# Patient Record
Sex: MALE | Race: WHITE | NOT HISPANIC OR LATINO | Employment: FULL TIME | URBAN - METROPOLITAN AREA
[De-identification: names, ages, dates, MRNs, and addresses within clinical notes are randomized per-mention and may not be internally consistent; named-entity substitution may affect disease eponyms.]

---

## 2020-03-25 ENCOUNTER — OFFICE VISIT (OUTPATIENT)
Dept: OTOLARYNGOLOGY | Facility: CLINIC | Age: 35
End: 2020-03-25
Payer: COMMERCIAL

## 2020-03-25 ENCOUNTER — OFFICE VISIT (OUTPATIENT)
Dept: AUDIOLOGY | Facility: CLINIC | Age: 35
End: 2020-03-25
Payer: COMMERCIAL

## 2020-03-25 VITALS — WEIGHT: 230 LBS | TEMPERATURE: 98.3 F | BODY MASS INDEX: 29.52 KG/M2 | HEIGHT: 74 IN

## 2020-03-25 DIAGNOSIS — H61.23 BILATERAL IMPACTED CERUMEN: ICD-10-CM

## 2020-03-25 DIAGNOSIS — H72.91 PERFORATION OF RIGHT TYMPANIC MEMBRANE: ICD-10-CM

## 2020-03-25 DIAGNOSIS — H69.83 ETD (EUSTACHIAN TUBE DYSFUNCTION), BILATERAL: Primary | ICD-10-CM

## 2020-03-25 DIAGNOSIS — H90.3 SENSORY HEARING LOSS, BILATERAL: Primary | ICD-10-CM

## 2020-03-25 PROCEDURE — 99204 OFFICE O/P NEW MOD 45 MIN: CPT | Performed by: NURSE PRACTITIONER

## 2020-03-25 PROCEDURE — 92567 TYMPANOMETRY: CPT | Performed by: AUDIOLOGIST

## 2020-03-25 PROCEDURE — 69210 REMOVE IMPACTED EAR WAX UNI: CPT | Performed by: NURSE PRACTITIONER

## 2020-03-25 PROCEDURE — 92557 COMPREHENSIVE HEARING TEST: CPT | Performed by: AUDIOLOGIST

## 2020-03-25 NOTE — PROGRESS NOTES
Assessment/Plan:    ETD (Eustachian tube dysfunction), bilateral  Audiogram today indicating bilateral normal hearing, low normal at 8K  Tymps type A left and B on right  Bilateral Tm with tympanosclerosis, unable to view tympanic perforation  Reviewed treatment options including nasal steroids, watchful monitoring for up to 3 months, vs surgical interventions  Briefly discussed in office pe tube if worsens  Bilateral impacted cerumen  On exam noted bilateral cerumen impaction and unable to fully view tympanic membrane  Cerumen impaction removed bilateral eac with alligator forceps and suction, pt tolerated procedure well  Upon removal, improved hearing and decreased clogged sensation of bilateral ears  Discussed routine cerumen care including avoidance of q-tips and cerumen softeners  Encourage ongoing follow up annually to monitor for cerumen and hearing  Diagnoses and all orders for this visit:    ETD (Eustachian tube dysfunction), bilateral  -     Ambulatory referral to Audiology  -     Ear cerumen removal    Bilateral impacted cerumen  -     Ear cerumen removal    Perforation of right tympanic membrane          Subjective:      Patient ID: Kaci Liriano is a 30 y o  male  Presents today as a new patient due to ear pain  Both ears blocked  Left worse than right  No otorrhea  Noticed increased cerumen on q-tips  Hearing decreased bilaterally  Feels whooshing out of right ear at times  History of perforation right ear  History of multiple ear infections as a child with ear tubes at age 5  Prior T&A as child as well  The following portions of the patient's history were reviewed and updated as appropriate: allergies, current medications, past family history, past medical history, past social history, past surgical history and problem list     Review of Systems   Constitutional: Negative  HENT: Positive for ear pain and hearing loss   Negative for congestion, ear discharge, nosebleeds, postnasal drip, rhinorrhea, sinus pressure, sinus pain, sore throat, tinnitus and voice change  Eyes: Negative  Respiratory: Negative for chest tightness and shortness of breath  Cardiovascular: Negative  Gastrointestinal: Negative  Endocrine: Negative  Musculoskeletal: Negative  Skin: Negative for color change  Neurological: Negative for dizziness, numbness and headaches  Psychiatric/Behavioral: Negative  Objective:      Temp 98 3 °F (36 8 °C) (Oral)   Ht 6' 2" (1 88 m)   Wt 104 kg (230 lb)   BMI 29 53 kg/m²            Physical Exam   Constitutional: He is oriented to person, place, and time  He appears well-developed and well-nourished  He is cooperative  HENT:   Head: Normocephalic  Right Ear: Hearing, tympanic membrane, external ear and ear canal normal  No drainage or tenderness  Tympanic membrane is not perforated and not erythematous  No decreased hearing is noted  Left Ear: Hearing, tympanic membrane, external ear and ear canal normal  No drainage or tenderness  Tympanic membrane is not perforated and not erythematous  No decreased hearing is noted  Nose: Nose normal  No sinus tenderness, nasal deformity or septal deviation  Mouth/Throat: Uvula is midline, oropharynx is clear and moist and mucous membranes are normal  Mucous membranes are not pale and not dry  No oral lesions  Normal dentition  No oropharyngeal exudate  Bilateral cerumen impaction  Bilateral tympanosclerosis     Neck: Normal range of motion and full passive range of motion without pain  Neck supple  No tracheal deviation present  Cardiovascular: Normal rate  Pulmonary/Chest: Effort normal  No accessory muscle usage  No respiratory distress  Musculoskeletal:        Right shoulder: He exhibits normal range of motion  Lymphadenopathy:     He has no cervical adenopathy  Neurological: He is alert and oriented to person, place, and time   No cranial nerve deficit or sensory deficit  Skin: Skin is warm, dry and intact  Psychiatric: He has a normal mood and affect  Ear cerumen removal  Date/Time: 3/25/2020 10:54 AM  Performed by: SYL Ashby  Authorized by: SYL Ashby     Patient location:  Clinic  Consent:     Consent obtained:  Verbal  Universal protocol:     Procedure explained and questions answered to patient or proxy's satisfaction: yes    Procedure details:     Location:  L ear, R ear and external auditory canal    Approach:  External  Post-procedure details:     Complication:  None    Hearing quality:  Normal    Patient tolerance of procedure:   Tolerated well, no immediate complications

## 2020-03-25 NOTE — ASSESSMENT & PLAN NOTE
On exam noted bilateral cerumen impaction and unable to fully view tympanic membrane  Cerumen impaction removed bilateral eac with alligator forceps and suction, pt tolerated procedure well  Upon removal, improved hearing and decreased clogged sensation of bilateral ears  Discussed routine cerumen care including avoidance of q-tips and cerumen softeners  Encourage ongoing follow up annually to monitor for cerumen and hearing

## 2020-03-25 NOTE — ASSESSMENT & PLAN NOTE
Audiogram today indicating bilateral normal hearing, low normal at 8K  Tymps type A left and B on right  Bilateral Tm with tympanosclerosis, unable to view tympanic perforation  Reviewed treatment options including nasal steroids, watchful monitoring for up to 3 months, vs surgical interventions  Briefly discussed in office pe tube if worsens

## 2020-03-25 NOTE — PROGRESS NOTES
ENT HEARING EVALUATION    Name:  Solo Rodriguez  :  1985  Age:  30 y o  Date of Evaluation: 20     History: ENT Audiogram  Reason for visit: Solo Rodriguez is being seen today at the request of SYL Del Real for an evaluation of hearing as part of their initial ENT visit  EVALUATION:    Otoscopic Evaluation:   Right Ear: Clear and healthy ear canal and tympanic membrane   Left Ear: Clear and healthy ear canal and tympanic membrane    Tympanometry:   Right: Type B (large ear canal volume) *repeated with same findings with both screening and diagnostic  Left: Type A - normal middle ear pressure and compliance    Audiogram Results:  Pure tone testing revealed normal hearing sensitivity bilaterally  SRT and PTA are in agreement indicating good test reliability  Word recognition scores were  excellent bilaterally       *see attached audiogram      RECOMMENDATIONS:  Consult ENT      Trini Graham , ENDY-A  Clinical Audiologist